# Patient Record
Sex: MALE | Race: WHITE
[De-identification: names, ages, dates, MRNs, and addresses within clinical notes are randomized per-mention and may not be internally consistent; named-entity substitution may affect disease eponyms.]

---

## 2017-11-23 ENCOUNTER — HOSPITAL ENCOUNTER (EMERGENCY)
Dept: HOSPITAL 17 - NEPK | Age: 27
Discharge: HOME | End: 2017-11-23
Payer: SELF-PAY

## 2017-11-23 VITALS
SYSTOLIC BLOOD PRESSURE: 148 MMHG | TEMPERATURE: 98.7 F | OXYGEN SATURATION: 97 % | DIASTOLIC BLOOD PRESSURE: 82 MMHG | HEART RATE: 87 BPM | RESPIRATION RATE: 20 BRPM

## 2017-11-23 VITALS — WEIGHT: 176.37 LBS | HEIGHT: 69 IN | BODY MASS INDEX: 26.12 KG/M2

## 2017-11-23 DIAGNOSIS — F41.9: ICD-10-CM

## 2017-11-23 DIAGNOSIS — I80.8: Primary | ICD-10-CM

## 2017-11-23 DIAGNOSIS — I10: ICD-10-CM

## 2017-11-23 PROCEDURE — 99283 EMERGENCY DEPT VISIT LOW MDM: CPT

## 2017-11-23 NOTE — PD
HPI


Chief Complaint:  Skin Problem


Time Seen by Provider:  15:36


Travel History


International Travel<30 days:  No


Contact w/Intl Traveler<30days:  No


Traveled to known affect area:  No





History of Present Illness


HPI


28 yo M c/o pain and swelling along R elbow for about 1 week. pt reports fever 

yesterday. he believes a box may have slid down his arm causing some injury 

while he was working. no open skin or bleed reproted. pain is gradually 

decreasing in severity. no swelling about the arm. no distal arm pain/swelling/

sensory change.





PFSH


Past Medical History


Anxiety:  Yes


Cardiovascular Problems:  No


Endocrine:  No


Genitourinary:  No


Hypertension:  Yes


Immune Disorder:  No


Musculoskeletal:  No


Neurologic:  No


Reproductive:  No


Respiratory:  No





Past Surgical History


Abdominal Surgery:  No


Cardiac Surgery:  No


Ear Surgery:  No


Endocrine Surgery:  No


Eye Surgery:  No


Genitourinary Surgery:  No


Gynecologic Surgery:  No


Oral Surgery:  No


Thoracic Surgery:  No


Other Surgery:  Yes





Social History


Alcohol Use:  Yes (WEEKENDS)


Tobacco Use:  No


Substance Use:  No





Allergies-Medications


(Allergen,Severity, Reaction):  


Coded Allergies:  


     No Known Allergies (Verified  Adverse Reaction, Unknown, 11/23/17)


Reported Meds & Prescriptions





Reported Meds & Active Scripts


Active


Keflex (Cephalexin) 500 Mg Cap 500 Mg PO Q8H 7 Days


Ibuprofen 600 Mg Tab 600 Mg PO Q8H 7 Days








Review of Systems


Except as stated in HPI:  all other systems reviewed are Neg


General / Constitutional:  No: Fever


Skin:  Positive Lesions, No Rash





Physical Exam


Narrative


GENERAL: 28 yo M, WNWD, NAD, pleasant


SKIN: Warm and dry. Along the Radial aspect of the antecubital fossa there is 

about 5 cm x 1 cm nodule minimal tender somewhat firm swelling. no fluctuance. 

no erythema or warmth. 


HEAD: Atraumatic. Normocephalic. 


EYES: Pupils equal and round. No scleral icterus. No injection or drainage. 


ENT: No nasal bleeding or discharge.  Mucous membranes pink and moist.


NECK: Trachea midline. No JVD. 


CARDIOVASCULAR: Regular rate and rhythm.  2+ radial artery pulse bilaterally. 


RESPIRATORY: No accessory muscle use. Clear to auscultation. Breath sounds 

equal bilaterally. 


GASTROINTESTINAL: Abdomen soft, non-tender, nondistended. Hepatic and splenic 

margins not palpable. 


MUSCULOSKELETAL: Extremities without clubbing, cyanosis, or edema. No obvious 

deformities. No sign upper extremity DVT.


NEUROLOGICAL: Awake and alert. No obvious cranial nerve deficits.  Motor 

grossly within normal limits. Five out of 5 muscle strength in the arms and 

legs.  Normal speech.


PSYCHIATRIC: Appropriate mood and affect; insight and judgment normal.





Data


Data


Last Documented VS





Vital Signs








  Date Time  Temp Pulse Resp B/P (MAP) Pulse Ox O2 Delivery O2 Flow Rate FiO2


 


11/23/17 15:30 98.7 87 20 148/82 (104) 97 Room Air  





vs reviewed


Orders





 Orders


Ed Discharge Order (11/23/17 15:59)


Ibuprofen (Motrin) (11/23/17 16:00)


Cephalexin (Keflex) (11/23/17 16:00)








MDM


Medical Decision Making


Medical Screen Exam Complete:  Yes


Emergency Medical Condition:  Yes


Medical Record Reviewed:  Yes


Differential Diagnosis


abscess, cellulitis, supervficial thrombophlebitis


Narrative Course


trace cellulitis is a possibility


superficial thrombophlebitis is a possible consideration


no sign DVT and Doppler carries very low pretest probability





Diagnosis





 Primary Impression:  


 Superficial thrombophlebitis


 Qualified Codes:  I80.8 - Phlebitis and thrombophlebitis of other sites


***Med/Other Pt SpecificInfo:  Prescription(s) given


Scripts


Cephalexin (Keflex) 500 Mg Cap


500 MG PO Q8H for Infection for 7 Days, #21 CAP 0 Refills


   Prov: Yaw Lancaster MD         11/23/17 


Ibuprofen (Ibuprofen) 600 Mg Tab


600 MG PO Q8H for 7 Days, #21 TAB 0 Refills


   Prov: Yaw Lancaster MD         11/23/17


Disposition:  01 DISCHARGE HOME


Condition:  Stable











Yaw Lancaster MD Nov 23, 2017 16:01

## 2018-03-09 ENCOUNTER — HOSPITAL ENCOUNTER (EMERGENCY)
Dept: HOSPITAL 17 - NEPD | Age: 28
Discharge: HOME | End: 2018-03-09
Payer: SELF-PAY

## 2018-03-09 VITALS
HEART RATE: 99 BPM | OXYGEN SATURATION: 96 % | SYSTOLIC BLOOD PRESSURE: 142 MMHG | DIASTOLIC BLOOD PRESSURE: 86 MMHG | TEMPERATURE: 98 F | RESPIRATION RATE: 18 BRPM

## 2018-03-09 VITALS — BODY MASS INDEX: 25.14 KG/M2 | WEIGHT: 169.76 LBS | HEIGHT: 69 IN

## 2018-03-09 DIAGNOSIS — D17.22: Primary | ICD-10-CM

## 2018-03-09 PROCEDURE — 99283 EMERGENCY DEPT VISIT LOW MDM: CPT

## 2018-03-09 NOTE — PD
HPI


Chief Complaint:  Skin Problem


Time Seen by Provider:  11:03


Travel History


International Travel<30 days:  No


Contact w/Intl Traveler<30days:  No


Traveled to known affect area:  No





History of Present Illness


HPI


27-year-old  male with recurrent tender firm lump in the right 

antecubital space of the right elbow on the lateral aspect.  Patient states he 

was seen for this several months ago and treated with antibiotics, and it 

seemed to improve.  He states in the last week the area has become more tender 

and indurated, patient denies IV drug use.  He denies injury to the area.  He 

denies fever, chills, or drainage.  Pain is currently about a 5 out of 10.  He 

has no known drug allergies.





PFSH


Past Medical History


Anxiety:  Yes


Cardiovascular Problems:  No


Diminished Hearing:  No


Endocrine:  No


Genitourinary:  No


Hypertension:  Yes


Immune Disorder:  No


Musculoskeletal:  No


Neurologic:  No


Reproductive:  No


Respiratory:  No


Tetanus Vaccination:  Unknown


Pregnant?:  Not Pregnant





Past Surgical History


Abdominal Surgery:  No


Cardiac Surgery:  No


Ear Surgery:  No


Endocrine Surgery:  No


Eye Surgery:  No


Genitourinary Surgery:  No


Gynecologic Surgery:  No


Oral Surgery:  No


Thoracic Surgery:  No


Other Surgery:  Yes





Social History


Alcohol Use:  Yes (WEEKENDS)


Tobacco Use:  No


Substance Use:  No





Allergies-Medications


(Allergen,Severity, Reaction):  


Coded Allergies:  


     No Known Allergies (Verified  Adverse Reaction, Unknown, 3/9/18)


Reported Meds & Prescriptions





Reported Meds & Active Scripts


Active


Keflex (Cephalexin) 500 Mg Cap 500 Mg PO Q8H 7 Days


Ibuprofen 600 Mg Tab 600 Mg PO Q8H 7 Days








Review of Systems


General / Constitutional:  No: Fever


Eyes:  No: Visual changes


HENT:  No: Headaches


Cardiovascular:  No: Chest Pain or Discomfort


Respiratory:  No: Shortness of Breath


Gastrointestinal:  No: Abdominal Pain


Genitourinary:  No: Dysuria


Musculoskeletal:  No: Pain


Skin:  Positive Lumps (See history of present), Positive Lesions (See history 

of present illness), No Rash


Neurologic:  No: Weakness


Psychiatric:  No: Depression


Endocrine:  No: Polydipsia


Hematologic/Lymphatic:  No: Easy Bruising





Physical Exam


Narrative


GENERAL: Patient is in no acute distress


SKIN: Warm and dry.  Normal color.  Normal turgor.  Patient has a firm rubbery 

feeling lump to the right lateral antecubital space of the right elbow 

insistent with either lipoma, scar tissue, or possible early abscess.  There is 

no significant erythema.  There is no streaking or lymphangitis there is no 

pointing.


HEAD: Atraumatic. Normocephalic.  


EYES: Pupils equal and round. No scleral icterus. No injection or drainage. 


ENT: No nasal bleeding or discharge.  Mucous membranes pink and moist.  Pharynx 

is clear.  Airways patent.


NECK: Trachea midline.  Supple and nontender.


CARDIOVASCULAR: Regular rate and rhythm.  No murmurs gallops or rubs


RESPIRATORY: No accessory muscle use. Clear to auscultation. Breath sounds 

equal bilaterally. 


GASTROINTESTINAL: Abdomen soft, non-tender, nondistended. Hepatic and splenic 

margins not palpable. 


MUSCULOSKELETAL: Extremities without clubbing, cyanosis, or edema. No obvious 

deformities. 


NEUROLOGICAL: Awake and alert. No obvious cranial nerve deficits.  Motor 

grossly within normal limits. Five out of 5 muscle strength in the arms and 

legs.  Normal speech.


PSYCHIATRIC: Appropriate mood and affect; insight and judgment normal.





Data


Data


Last Documented VS





Vital Signs








  Date Time  Temp Pulse Resp B/P (MAP) Pulse Ox O2 Delivery O2 Flow Rate FiO2


 


3/9/18 09:58 98.0 99 18 142/86 (104) 96   











MDM


Medical Decision Making


Medical Screen Exam Complete:  Yes


Emergency Medical Condition:  Yes


Differential Diagnosis


Lipoma.  Scar tissue.  Keloid.  Cellulitis.  Early abscess.


Narrative Course


Patient is given Bactrim DS twice daily 7 days per


Patient is given ibuprofen 800 mg 3 times daily as needed pain #30.


Patient to follow-up with [facility] as needed.





Diagnosis





 Primary Impression:  


 Lipoma of left upper extremity


Referrals:  


[facility]


Patient Instructions:  General Instructions





***Additional Instructions:  


Patient is given Bactrim DS twice daily 7 days per


Patient is given ibuprofen 800 mg 3 times daily as needed pain #30.


Patient to follow-up with [facility] as needed.


***Med/Other Pt SpecificInfo:  Prescription(s) given


Disposition:  01 DISCHARGE HOME


Condition:  Stable











Shimon Michel Mar 9, 2018 11:15

## 2018-03-31 ENCOUNTER — HOSPITAL ENCOUNTER (EMERGENCY)
Dept: HOSPITAL 17 - NEPD | Age: 28
Discharge: HOME | End: 2018-03-31
Payer: SELF-PAY

## 2018-03-31 VITALS
RESPIRATION RATE: 18 BRPM | TEMPERATURE: 98 F | OXYGEN SATURATION: 98 % | SYSTOLIC BLOOD PRESSURE: 127 MMHG | HEART RATE: 84 BPM | DIASTOLIC BLOOD PRESSURE: 73 MMHG

## 2018-03-31 VITALS — HEIGHT: 69 IN | BODY MASS INDEX: 25.96 KG/M2 | WEIGHT: 175.27 LBS

## 2018-03-31 VITALS — SYSTOLIC BLOOD PRESSURE: 125 MMHG | DIASTOLIC BLOOD PRESSURE: 74 MMHG

## 2018-03-31 DIAGNOSIS — F41.9: ICD-10-CM

## 2018-03-31 DIAGNOSIS — L08.9: Primary | ICD-10-CM

## 2018-03-31 DIAGNOSIS — F19.11: ICD-10-CM

## 2018-03-31 DIAGNOSIS — I10: ICD-10-CM

## 2018-03-31 PROCEDURE — 99283 EMERGENCY DEPT VISIT LOW MDM: CPT

## 2018-03-31 NOTE — PD
HPI


Chief Complaint:  Lump, Cyst, Hernia


Time Seen by Provider:  11:26


Travel History


International Travel<30 days:  No


Contact w/Intl Traveler<30days:  No


Traveled to known affect area:  No





History of Present Illness


HPI


This patient complains of an infected lesion on his right arm.  This is at the 

site where he used to inject drugs.  He says his last injection was 2 months 

ago.  He does have history of IV drug abuse.  He's had no fever.  He took a 

week of Bactrim but it didn't completely clear this up.  No drainage.  Symptoms 

severity is mild





PFSH


Past Medical History


Anxiety:  Yes


Cardiovascular Problems:  No


Diminished Hearing:  No


Endocrine:  No


Genitourinary:  No


Hypertension:  Yes


Immune Disorder:  No


Musculoskeletal:  No


Neurologic:  No


Reproductive:  No


Respiratory:  No





Past Surgical History


Abdominal Surgery:  No


Cardiac Surgery:  No


Ear Surgery:  No


Endocrine Surgery:  No


Eye Surgery:  No


Genitourinary Surgery:  No


Gynecologic Surgery:  No


Oral Surgery:  No


Thoracic Surgery:  No


Other Surgery:  Yes





Social History


Alcohol Use:  Yes (WEEKENDS)


Tobacco Use:  No


Substance Use:  No





Allergies-Medications


(Allergen,Severity, Reaction):  


Coded Allergies:  


     No Known Allergies (Verified  Adverse Reaction, Unknown, 3/9/18)


Reported Meds & Prescriptions





Reported Meds & Active Scripts


Active


Clindamycin (Clindamycin HCl) 150 Mg Cap 300 Mg PO Q8HR 10 Days


Doxycycline Hyclate 100 Mg Cap 100 Mg PO BID


Ibuprofen 800 Mg Tab 800 Mg PO Q8H PRN


Bactrim DS (Sulfamethoxazole-Trimethoprim) 800-160 Mg Tab 1 Tab PO BID


Keflex (Cephalexin) 500 Mg Cap 500 Mg PO Q8H 7 Days


Ibuprofen 600 Mg Tab 600 Mg PO Q8H 7 Days








Review of Systems


General / Constitutional:  No: Fever


Eyes:  No: Visual changes


HENT:  No: Headaches


Cardiovascular:  No: Chest Pain or Discomfort


Respiratory:  No: Shortness of Breath


Gastrointestinal:  No: Abdominal Pain


Genitourinary:  No: Dysuria


Musculoskeletal:  No: Pain


Skin:  Positive Lesions, No Rash


Neurologic:  No: Weakness


Psychiatric:  No: Depression


Endocrine:  No: Polydipsia


Hematologic/Lymphatic:  No: Easy Bruising





Physical Exam


Narrative


GENERAL: Well-nourished, well-developed patient in no apparent distress.


SKIN: Focused skin assessment reveals no rash and nodules. Skin is Warm and dry.


HEAD: Atraumatic. Normocephalic. 


EYES: Pupils equal and round. No scleral icterus. No injection or drainage. 


ENT: No nasal bleeding or discharge.  Mucous membranes pink and moist.


NECK: Trachea midline. No JVD. 


CARDIOVASCULAR: Regular rate and rhythm.  No murmur appreciated.


RESPIRATORY: No accessory muscle use. Clear to auscultation. Breath sounds 

equal bilaterally. 


GASTROINTESTINAL: Abdomen soft, non-tender, nondistended. Hepatic and splenic 

margins not palpable. 


MUSCULOSKELETAL: No obvious deformities. No clubbing.  No cyanosis.  No edema.  

Patient has a small erythematous indurated area on the right forearm near the 

antecubital fossa.  There is no fluctuance or drainage.  No adenopathy


NEUROLOGICAL: Awake and alert. No obvious cranial nerve deficits.  Motor 

grossly within normal limits. Normal speech.


PSYCHIATRIC: Appropriate mood and affect; insight and judgment normal.





Data


Data


Last Documented VS





Vital Signs








  Date Time  Temp Pulse Resp B/P (MAP) Pulse Ox O2 Delivery O2 Flow Rate FiO2


 


3/31/18 11:03 98.0 84 18 127/73 (91) 98   











MDM


Medical Decision Making


Medical Screen Exam Complete:  Yes


Emergency Medical Condition:  Yes


Medical Record Reviewed:  Yes


Differential Diagnosis


Infected lesion, cellulitis, abscess


Narrative Course


I have reviewed the patient's electronic medical record.  I reviewed his visit 

from 3 weeks ago.  Was diagnosed as a lipoma which it is clearly not a lipoma.


Does have characteristics of infection





It is not fluctuant and is nowhere near appropriate for incision and drainage





Have no clinical suspicion of endocarditis.  He has no fever or tachycardia or 

chest symptoms or murmur


He does not require blood cultures or inpatient care or echocardiogram





He is currently in a drug rehabilitation place and has not used for 2 months





I gave him 10 days of a combination of doxycycline and clindamycin


Recommend warm compresses


Return for any worsening





Diagnosis





 Primary Impression:  


 Infected skin lesion


 Additional Impression:  


 History of intravenous drug abuse





***Additional Instructions:  


The patient was advised to follow up with their physician and return if they 

worsen.


Take antibiotics


Use warm compresses


***Med/Other Pt SpecificInfo:  Prescription(s) given


Scripts


Clindamycin (Clindamycin) 150 Mg Cap


300 MG PO Q8HR for Infection for 10 Days, CAP 0 Refills


   Prov: Dallas Villegas MD         3/31/18 


Doxycycline Hyclate (Doxycycline Hyclate) 100 Mg Cap


100 MG PO BID for Infection, #20 CAP 0 Refills


   Prov: Dallas Villegas MD         3/31/18


Disposition:  01 DISCHARGE HOME


Condition:  Stable











Dallas Villegas MD Mar 31, 2018 12:55

## 2018-05-29 ENCOUNTER — HOSPITAL ENCOUNTER (OUTPATIENT)
Dept: HOSPITAL 17 - NEPC | Age: 28
Setting detail: OBSERVATION
LOS: 1 days | Discharge: LEFT BEFORE DISCHARGE | End: 2018-05-30
Attending: FAMILY MEDICINE | Admitting: FAMILY MEDICINE
Payer: SELF-PAY

## 2018-05-29 VITALS
OXYGEN SATURATION: 98 % | RESPIRATION RATE: 16 BRPM | DIASTOLIC BLOOD PRESSURE: 75 MMHG | SYSTOLIC BLOOD PRESSURE: 141 MMHG | TEMPERATURE: 102.1 F | HEART RATE: 102 BPM

## 2018-05-29 VITALS
RESPIRATION RATE: 16 BRPM | DIASTOLIC BLOOD PRESSURE: 85 MMHG | TEMPERATURE: 99 F | HEART RATE: 84 BPM | OXYGEN SATURATION: 100 % | SYSTOLIC BLOOD PRESSURE: 142 MMHG

## 2018-05-29 VITALS — BODY MASS INDEX: 27.76 KG/M2 | WEIGHT: 187.39 LBS | HEIGHT: 69 IN

## 2018-05-29 DIAGNOSIS — F41.9: ICD-10-CM

## 2018-05-29 DIAGNOSIS — F19.10: ICD-10-CM

## 2018-05-29 DIAGNOSIS — R50.9: ICD-10-CM

## 2018-05-29 DIAGNOSIS — I10: ICD-10-CM

## 2018-05-29 DIAGNOSIS — T40.1X1A: Primary | ICD-10-CM

## 2018-05-29 DIAGNOSIS — F17.210: ICD-10-CM

## 2018-05-29 DIAGNOSIS — L08.9: ICD-10-CM

## 2018-05-29 DIAGNOSIS — I45.10: ICD-10-CM

## 2018-05-29 LAB
ALBUMIN SERPL-MCNC: 3.9 GM/DL (ref 3.4–5)
ALP SERPL-CCNC: 69 U/L (ref 45–117)
ALT SERPL-CCNC: 28 U/L (ref 12–78)
AST SERPL-CCNC: 25 U/L (ref 15–37)
BASOPHILS # BLD AUTO: 0.1 TH/MM3 (ref 0–0.2)
BASOPHILS NFR BLD: 0.6 % (ref 0–2)
BILIRUB SERPL-MCNC: 0.5 MG/DL (ref 0.2–1)
BUN SERPL-MCNC: 9 MG/DL (ref 7–18)
CALCIUM SERPL-MCNC: 8.7 MG/DL (ref 8.5–10.1)
CHLORIDE SERPL-SCNC: 103 MEQ/L (ref 98–107)
COLOR UR: YELLOW
CREAT SERPL-MCNC: 1.13 MG/DL (ref 0.6–1.3)
EOSINOPHIL # BLD: 0 TH/MM3 (ref 0–0.4)
EOSINOPHIL NFR BLD: 0.2 % (ref 0–4)
ERYTHROCYTE [DISTWIDTH] IN BLOOD BY AUTOMATED COUNT: 14.4 % (ref 11.6–17.2)
GFR SERPLBLD BASED ON 1.73 SQ M-ARVRAT: 77 ML/MIN (ref 89–?)
GLUCOSE SERPL-MCNC: 119 MG/DL (ref 74–106)
GLUCOSE UR STRIP-MCNC: (no result) MG/DL
HCO3 BLD-SCNC: 27.7 MEQ/L (ref 21–32)
HCT VFR BLD CALC: 39.4 % (ref 39–51)
HGB BLD-MCNC: 13.5 GM/DL (ref 13–17)
HGB UR QL STRIP: (no result)
HYALINE CASTS #/AREA URNS LPF: 2 /LPF
KETONES UR STRIP-MCNC: (no result) MG/DL
LYMPHOCYTES # BLD AUTO: 1 TH/MM3 (ref 1–4.8)
LYMPHOCYTES NFR BLD AUTO: 8.2 % (ref 9–44)
MCH RBC QN AUTO: 29 PG (ref 27–34)
MCHC RBC AUTO-ENTMCNC: 34.4 % (ref 32–36)
MCV RBC AUTO: 84.3 FL (ref 80–100)
MONOCYTE #: 0.9 TH/MM3 (ref 0–0.9)
MONOCYTES NFR BLD: 8.1 % (ref 0–8)
MUCOUS THREADS #/AREA URNS LPF: (no result) /LPF
NEUTROPHILS # BLD AUTO: 9.6 TH/MM3 (ref 1.8–7.7)
NEUTROPHILS NFR BLD AUTO: 82.9 % (ref 16–70)
NITRITE UR QL STRIP: (no result)
PLATELET # BLD: 234 TH/MM3 (ref 150–450)
PMV BLD AUTO: 8.4 FL (ref 7–11)
PROT SERPL-MCNC: 7.2 GM/DL (ref 6.4–8.2)
RBC # BLD AUTO: 4.67 MIL/MM3 (ref 4.5–5.9)
SODIUM SERPL-SCNC: 137 MEQ/L (ref 136–145)
SP GR UR STRIP: 1.02 (ref 1–1.03)
URINE LEUKOCYTE ESTERASE: (no result)
WBC # BLD AUTO: 11.5 TH/MM3 (ref 4–11)

## 2018-05-29 PROCEDURE — 80048 BASIC METABOLIC PNL TOTAL CA: CPT

## 2018-05-29 PROCEDURE — 87389 HIV-1 AG W/HIV-1&-2 AB AG IA: CPT

## 2018-05-29 PROCEDURE — 96368 THER/DIAG CONCURRENT INF: CPT

## 2018-05-29 PROCEDURE — 85025 COMPLETE CBC W/AUTO DIFF WBC: CPT

## 2018-05-29 PROCEDURE — G0378 HOSPITAL OBSERVATION PER HR: HCPCS

## 2018-05-29 PROCEDURE — G0475 HIV COMBINATION ASSAY: HCPCS

## 2018-05-29 PROCEDURE — 71045 X-RAY EXAM CHEST 1 VIEW: CPT

## 2018-05-29 PROCEDURE — 80053 COMPREHEN METABOLIC PANEL: CPT

## 2018-05-29 PROCEDURE — 80074 ACUTE HEPATITIS PANEL: CPT

## 2018-05-29 PROCEDURE — 96365 THER/PROPH/DIAG IV INF INIT: CPT

## 2018-05-29 PROCEDURE — 96361 HYDRATE IV INFUSION ADD-ON: CPT

## 2018-05-29 PROCEDURE — 81001 URINALYSIS AUTO W/SCOPE: CPT

## 2018-05-29 PROCEDURE — 80307 DRUG TEST PRSMV CHEM ANLYZR: CPT

## 2018-05-29 PROCEDURE — 99285 EMERGENCY DEPT VISIT HI MDM: CPT

## 2018-05-29 PROCEDURE — 87040 BLOOD CULTURE FOR BACTERIA: CPT

## 2018-05-29 PROCEDURE — 93005 ELECTROCARDIOGRAM TRACING: CPT

## 2018-05-29 NOTE — HHI.HP
HPI


Service


Family Medicine


Primary Care Physician


Unknown


Admission Diagnosis





Diagnoses:  


International Travel<30 Days:  No


Contact w/Intl Traveler<30days:  No


Known Affected Area:  No


History of Present Illness


26 y/o M presents w/fever after heroin overdose.





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 

left arm (extensor surface) at work today. Took less than his usual amount but 

was told that after using, he started having agonal breathing and became 

cyanotic at his desk. Underwent CPR and resuscitation. A co-worker found 

narcane and gave it to him twice, which revived him. He says that he is now 

feeling fine. Similar episodes of overdose have happened 8 times, but he has 

not been hospitalized each time. Has had "sterile" abscesses in his arm, his 

last abscess appearing two months ago as a large nodule on the lateral edge of 

his right elbow.  He does not know what it is, describes it as appearing after 

"shooting up" and is tender to touch.  Has received antibiotics for it twice, 

but each time, it has worsened after some time. Last treated 3-4 months ago for 

infection at the site. Thinks it is currently a scar.





When patient woke up this morning, he also injected heroin, and afterwards felt 

hot and nauseous. No rhinorrhea. No chest pain, SOB, or diarrhea. No cough. No 

recent headaches or neck stiffness. No back pain. 


Does not have a PCP. 





Hx solely of heroin use, states he has only used clean needles. Before today, 

he had been clean for 45 days after attending a detox in [place], then 

staying at a half-way house. Goes to  and has a sponsor.





Review of Systems


Constitutional:  DENIES: Weight loss, Change in appetite, Night Sweats


Endocrine:  DENIES: Polyuria


Eyes:  DENIES: Blurred vision, Vision loss


Ears, nose, mouth, throat:  DENIES: Hearing loss, Throat pain, Running Nose


Respiratory:  DENIES: Cough, Shortness of breath


Cardiovascular:  DENIES: Chest pain, Palpitations


Gastrointestinal:  DENIES: Constipation, Diarrhea, Vomiting


Genitourinary:  DENIES: Urinary frequency, Urinary incontinence


Musculoskeletal:  DENIES: Joint pain


Hematologic/lymphatic:  DENIES: Bruising


Immunologic/allergic:  DENIES: Eczema


Neurologic:  DENIES: Headache, Poor Balance





Past Family Social History


Past Medical History


Anxiety


Past Surgical History


Titanium machelle in femur for fracture - 4 years ago


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


Allergies:  


Coded Allergies:  


     No Known Allergies (Verified  Adverse Reaction, Unknown, 5/29/18)


Family History


Mom: not known


Dad: not known


Social History


Lives at a sober house in [place]


Smokes 1 pack/day


No other recreational/illicit drug use


3 beers x1 on the weekend





Physical Exam


Vital Signs





Vital Signs








  Date Time  Temp Pulse Resp B/P (MAP) Pulse Ox O2 Delivery O2 Flow Rate FiO2


 


5/29/18 22:42 99.0 84 16 142/85 (104) 100 Room Air  


 


5/29/18 20:05 102.1 102 16 141/75 (97) 98   


 


5/29/18 20:03  112    Room Air  








Physical Exam


GENERAL: This is a well-nourished, heavily tatooed M resting comfortably in 

bed. In no acute distress. 


SKIN: Cool and dry. 1 inch area of elevated skin that is dark purple w/ faint 

surrounding erythema less than a mm from edge on the lateral extensor surface 

of the right elbow. Some tenderness to palpation. Area of induration v skin 

thickening (track natalee) felt at site. No lesions observed on hands, legs, nick, 

between toes or fingers. No splinter hemorrhages or janeway lesions noted.


HEAD: Atraumatic. Normocephalic. No temporal or scalp tenderness.


EYES: Pupils equal round and reactive. Extraocular motions intact. No scleral 

icterus. No injection or drainage. 


ENT: Nose without bleeding, purulent drainage or septal hematoma. Throat 

without erythema, tonsillar hypertrophy or exudate. Uvula midline. Airway 

patent.


NECK: Trachea midline. No lymphadenopathy. Supple, nontender.


CARDIOVASCULAR: Regular rate and rhythm without murmurs, gallops, or rubs 

auscultated. 


RESPIRATORY: Clear to auscultation. Breath sounds equal bilaterally. No wheezes

, rales, or rhonchi.  


GASTROINTESTINAL: Abdomen soft, non-tender, nondistended. No hepato-splenomegaly

, or palpable masses. No guarding.


MUSCULOSKELETAL: Extremities without clubbing, cyanosis, or edema. No joint 

tenderness, effusion, or edema noted. 


NEUROLOGICAL: Awake and alert. No focal deficits.  Motor and sensory grossly 

within normal limits.  Normal speech.


Laboratory





Laboratory Tests








Test


  5/29/18


20:25 5/29/18


22:10


 


White Blood Count 11.5  


 


Red Blood Count 4.67  


 


Hemoglobin 13.5  


 


Hematocrit 39.4  


 


Mean Corpuscular Volume 84.3  


 


Mean Corpuscular Hemoglobin 29.0  


 


Mean Corpuscular Hemoglobin


Concent 34.4 


  


 


 


Red Cell Distribution Width 14.4  


 


Platelet Count 234  


 


Mean Platelet Volume 8.4  


 


Neutrophils (%) (Auto) 82.9  


 


Lymphocytes (%) (Auto) 8.2  


 


Monocytes (%) (Auto) 8.1  


 


Eosinophils (%) (Auto) 0.2  


 


Basophils (%) (Auto) 0.6  


 


Neutrophils # (Auto) 9.6  


 


Lymphocytes # (Auto) 1.0  


 


Monocytes # (Auto) 0.9  


 


Eosinophils # (Auto) 0.0  


 


Basophils # (Auto) 0.1  


 


CBC Comment DIFF FINAL  


 


Differential Comment   


 


Blood Urea Nitrogen 9  


 


Creatinine 1.13  


 


Random Glucose 119  


 


Total Protein 7.2  


 


Albumin 3.9  


 


Calcium Level 8.7  


 


Alkaline Phosphatase 69  


 


Aspartate Amino Transf


(AST/SGOT) 25 


  


 


 


Alanine Aminotransferase


(ALT/SGPT) 28 


  


 


 


Total Bilirubin 0.5  


 


Sodium Level 137  


 


Potassium Level 3.6  


 


Chloride Level 103  


 


Carbon Dioxide Level 27.7  


 


Anion Gap 6  


 


Estimat Glomerular Filtration


Rate 77 


  


 


 


Urine Color  YELLOW 


 


Urine Turbidity  CLEAR 


 


Urine pH  6.5 


 


Urine Specific Gravity  1.018 


 


Urine Protein  NEG 


 


Urine Glucose (UA)  NEG 


 


Urine Ketones  NEG 


 


Urine Occult Blood  NEG 


 


Urine Nitrite  NEG 


 


Urine Bilirubin  NEG 


 


Urine Urobilinogen  LESS THAN 2.0 


 


Urine Leukocyte Esterase  NEG 


 


Urine RBC  1 


 


Urine WBC  1 


 


Urine Hyaline Casts  2 


 


Urine Mucus  FEW 


 


Microscopic Urinalysis Comment


  


  CULT NOT


INDICATED


 


Urine Opiates Screen  POS 


 


Urine Barbiturates Screen  NEG 


 


Urine Amphetamines Screen  NEG 


 


Urine Benzodiazepines Screen  NEG 


 


Urine Cocaine Screen  POS 


 


Urine Cannabinoids Screen  NEG 














 Date/Time


Source Procedure


Growth Status


 


 


 5/29/18 21:50


Blood Peripheral Aerobic Blood Culture


Pending Received


 


 5/29/18 21:50


Blood Peripheral Anaerobic Blood Culture


Pending Received








Result Diagram:  


5/29/18 2025 5/29/18 2025





Imaging





Last Impressions








Chest X-Ray 5/29/18 0000 Signed





Impressions: 





 CONCLUSION: 





 No acute intrathoracic disease.





  





 








Course


Per ED physician note:


"Initial vital signs show heart rate 102, blood pressure 141/75, pulse ox 90% 

on room air, oral temp of 102.1F.





CBC: WBC 11.5, hemoglobin 13.5, hematocrit 39.4, platelets 234, neutrophils 83%.


CMP is unremarkable.


UA is not suggestive of UTI.


Urine drug screen is positive for opiates and cocaine.





Chest x-ray:


No acute intrathoracic disease.





Given fever with this history of IV drug use, blood cultures were obtained.  

The patient was started empirically on antibiotics to cover for bacteremia/

endocarditis."





Caprini VTE Risk Assessment


Caprini VTE Risk Assessment:  No/Low Risk (score <= 1)





Assessment and Plan


Assessment and Plan


26 y/o M w/hx of IVDU presenting w/fever (102.1). Admitted for observation to 

further assess for infection and r/o endocarditis.


Code Status


FULL


Problem List:  


(1) Fever


ICD Codes:  R50.9 - Fever, unspecified


Status:  Acute


Plan:  WBC count slightly elevated


IVDU hx w/clean needles


CXR clear, physical exam benign except for chronic track natalee +/- developing 

skin infection


Vanc and Zosyn x1, Tyenol, NS bolus x1 in the ED


Meets two of [facility] minor criteria, no indication to treat w/antibiotics at 

this time





2D echo


Tylenol PRN for fever >100.4


HIV screen


Hep profile


blood cultures


cardiac tele





(2) IV drug abuse


ICD Codes:  F19.10 - Other psychoactive substance abuse, uncomplicated


Status:  Chronic


Plan:  Has had several relapses





CM consulted





(3) FEN


Plan:  Fluids: not indicated


Electrolytes: none


Nutrition: regular


DVT prophy: not indicated








Problem Qualifiers





(1) Fever:  


Qualified Codes:  R50.9 - Fever, unspecified








Kiara Garcia MD R1 May 29, 2018 23:29

## 2018-05-29 NOTE — PD
HPI


Chief Complaint:  OD/ Ingestion


Time Seen by Provider:  20:03


Travel History


International Travel<30 days:  No


Contact w/Intl Traveler<30days:  No


Traveled to known affect area:  No





History of Present Illness


HPI


27-year-old male brought in by [facility] after an unintentional opiate overdose.  

The patient injected heroin while at work.  His coworkers found him 

unresponsive.  They began doing chest compressions and administered 8 mg of 

nasal Narcan with significant improvement in mental status.  Patient arrives 

stating that he feels dope sick.  He denies using any other drugs.  He reports 

that this was an unintentional overdose.  He is not suicidal.





[facility]


Past Medical History


Anxiety:  Yes


Cardiovascular Problems:  No


Diminished Hearing:  No


Endocrine:  No


Genitourinary:  No


Hypertension:  Yes


Immune Disorder:  No


Musculoskeletal:  No


Neurologic:  No


Reproductive:  No


Respiratory:  No





Past Surgical History


Abdominal Surgery:  No


Cardiac Surgery:  No


Ear Surgery:  No


Endocrine Surgery:  No


Eye Surgery:  No


Genitourinary Surgery:  No


Gynecologic Surgery:  No


Oral Surgery:  No


Thoracic Surgery:  No


Other Surgery:  Yes





Social History


Alcohol Use:  Yes (WEEKENDS)


Tobacco Use:  No


Substance Use:  No





Allergies-Medications


(Allergen,Severity, Reaction):  


Coded Allergies:  


     No Known Allergies (Verified  Adverse Reaction, Unknown, 5/29/18)


Reported Meds & Prescriptions





Reported Meds & Active Scripts


Active


No Active Prescriptions or Reported Medications    








Review of Systems


Except as stated in HPI:  all other systems reviewed are Neg





Physical Exam


Narrative


GENERAL: Well-developed, well-nourished, awake, alert, no apparent distress.


SKIN: Focused skin assessment warm/dry.  No splinter hemorrhages, Janeway 

lesions, or Osler nodes.


HEAD: Atraumatic. Normocephalic. 


EYES: Pupils equal and round. No scleral icterus. No injection or drainage. 


ENT: No nasal bleeding or discharge.  Mucous membranes pink and dry.


NECK: Trachea midline. No JVD. 


CARDIOVASCULAR: Tachycardic, rate 110, regular.  No murmurs.


RESPIRATORY: No accessory muscle use. Clear to auscultation. Breath sounds 

equal bilaterally. 


GASTROINTESTINAL: Abdomen soft, non-tender, nondistended. 


MUSCULOSKELETAL: No obvious deformities. No clubbing.  No cyanosis.  No edema. 


NEUROLOGICAL: Awake and alert. No obvious cranial nerve deficits.  Motor 

grossly within normal limits. Normal speech.


PSYCHIATRIC: Appropriate mood and affect; insight and judgment normal.





Data


Data


Last Documented VS





Vital Signs








  Date Time  Temp Pulse Resp B/P (MAP) Pulse Ox O2 Delivery O2 Flow Rate FiO2


 


5/29/18 22:42 99.0 84 16 142/85 (104) 100 Room Air  








Orders





 Orders


Sodium Chlor 0.9% 1000 Ml Inj (Ns 1000 M (5/29/18 20:15)


Complete Blood Count With Diff (5/29/18 20:19)


Comprehensive Metabolic Panel (5/29/18 20:19)


Iv Access Insert/Monitor (5/29/18 20:19)


Ecg Monitoring (5/29/18 20:19)


Oximetry (5/29/18 20:19)


Sodium Chloride 0.9% Flush (Ns Flush) (5/29/18 20:30)


Acetaminophen (Tylenol) (5/29/18 20:30)


Urinalysis - C+S If Indicated (5/29/18 21:29)


Drug Screen, Random Urine (5/29/18 21:29)


Chest, Single Ap (5/29/18 )


Blood Culture (5/29/18 21:29)


Electrocardiogram (5/29/18 20:03)


Sodium Chlor 0.9% 1000 Ml Inj (Ns 1000 M (5/29/18 22:00)


Vancomycin Inj (Vancomycin Inj) (5/29/18 22:15)


Piperacil-Tazo 4.5 Gm Premix (Zosyn 4.5 (5/29/18 22:15)





Labs





Laboratory Tests








Test


  5/29/18


20:25 5/29/18


22:10


 


White Blood Count 11.5 TH/MM3  


 


Red Blood Count 4.67 MIL/MM3  


 


Hemoglobin 13.5 GM/DL  


 


Hematocrit 39.4 %  


 


Mean Corpuscular Volume 84.3 FL  


 


Mean Corpuscular Hemoglobin 29.0 PG  


 


Mean Corpuscular Hemoglobin


Concent 34.4 % 


  


 


 


Red Cell Distribution Width 14.4 %  


 


Platelet Count 234 TH/MM3  


 


Mean Platelet Volume 8.4 FL  


 


Neutrophils (%) (Auto) 82.9 %  


 


Lymphocytes (%) (Auto) 8.2 %  


 


Monocytes (%) (Auto) 8.1 %  


 


Eosinophils (%) (Auto) 0.2 %  


 


Basophils (%) (Auto) 0.6 %  


 


Neutrophils # (Auto) 9.6 TH/MM3  


 


Lymphocytes # (Auto) 1.0 TH/MM3  


 


Monocytes # (Auto) 0.9 TH/MM3  


 


Eosinophils # (Auto) 0.0 TH/MM3  


 


Basophils # (Auto) 0.1 TH/MM3  


 


CBC Comment DIFF FINAL  


 


Differential Comment   


 


Blood Urea Nitrogen 9 MG/DL  


 


Creatinine 1.13 MG/DL  


 


Random Glucose 119 MG/DL  


 


Total Protein 7.2 GM/DL  


 


Albumin 3.9 GM/DL  


 


Calcium Level 8.7 MG/DL  


 


Alkaline Phosphatase 69 U/L  


 


Aspartate Amino Transf


(AST/SGOT) 25 U/L 


  


 


 


Alanine Aminotransferase


(ALT/SGPT) 28 U/L 


  


 


 


Total Bilirubin 0.5 MG/DL  


 


Sodium Level 137 MEQ/L  


 


Potassium Level 3.6 MEQ/L  


 


Chloride Level 103 MEQ/L  


 


Carbon Dioxide Level 27.7 MEQ/L  


 


Anion Gap 6 MEQ/L  


 


Estimat Glomerular Filtration


Rate 77 ML/MIN 


  


 


 


Urine Color  YELLOW 


 


Urine Turbidity  CLEAR 


 


Urine pH  6.5 


 


Urine Specific Gravity  1.018 


 


Urine Protein  NEG mg/dL 


 


Urine Glucose (UA)  NEG mg/dL 


 


Urine Ketones  NEG mg/dL 


 


Urine Occult Blood  NEG 


 


Urine Nitrite  NEG 


 


Urine Bilirubin  NEG 


 


Urine Urobilinogen


  


  LESS THAN 2.0


MG/DL


 


Urine Leukocyte Esterase  NEG 


 


Urine RBC  1 /hpf 


 


Urine WBC  1 /hpf 


 


Urine Hyaline Casts  2 /lpf 


 


Urine Mucus  FEW /lpf 


 


Microscopic Urinalysis Comment


  


  CULT NOT


INDICATED


 


Urine Opiates Screen  POS 


 


Urine Barbiturates Screen  NEG 


 


Urine Amphetamines Screen  NEG 


 


Urine Benzodiazepines Screen  NEG 


 


Urine Cocaine Screen  POS 


 


Urine Cannabinoids Screen  NEG 











MDM


Medical Decision Making


Medical Screen Exam Complete:  Yes


Emergency Medical Condition:  Yes


Interpretation(s)


EKG: Sinus, rate 107, normal axis, normal intervals, no acute ischemic 

abnormality.


Differential Diagnosis


Opioid overdose, bacteremia, pneumonia, endocarditis, drug-induced hyperthermia


Narrative Course


Initial vital signs show heart rate 102, blood pressure 141/75, pulse ox 90% on 

room air, oral temp of 102.1F.





CBC: WBC 11.5, hemoglobin 13.5, hematocrit 39.4, platelets 234, neutrophils 83%.


CMP is unremarkable.


UA is not suggestive of UTI.


Urine drug screen is positive for opiates and cocaine.





Chest x-ray:


No acute intrathoracic disease.





Given fever with this history of IV drug use, blood cultures were obtained.  

The patient was started empirically on antibiotics to cover for bacteremia/

endocarditis.  He will be admitted for observation and follow-up blood 

cultures.  He is amenable to this plan.





Case discussed with the medical residents.  They will make the patient to their 

service under Dr. De Jesus





Diagnosis





 Primary Impression:  


 IV drug abuse


 Additional Impressions:  


 Fever


 Qualified Codes:  R50.9 - Fever, unspecified


 Opioid overdose


 Qualified Codes:  T40.2X1A - Poisoning by other opioids, accidental (

unintentional), initial encounter


 R/O Endocarditis





Admitting Information


Admitting Physician Requests:  Observation


Scripts


No Active Prescriptions or Reported Meds











Vishnu Unger MD May 29, 2018 20:05

## 2018-05-29 NOTE — RADRPT
EXAM DATE:  5/29/2018 9:44 PM EDT

AGE/SEX:        27 years / Male



INDICATIONS:  Fever.



CLINICAL DATA:  This is the patient's initial encounter. Patient reports that signs and symptoms have
 been present for 1 day and indicates a pain score of 0/10. 

                                                                          

MEDICAL/SURGICAL HISTORY:       None. None.



[place]      [facility] [place].  .



FINDINGS:  

A single AP view of the chest demonstrates the lungs to be symmetrically aerated without evidence of 
mass, infiltrate or effusion.  The cardiomediastinal contours are unremarkable.  Osseous structures a
re intact.  





CONCLUSION: 

No acute intrathoracic disease.



Electronically signed by: Carroll Fitzgerald MD  5/29/2018 9:49 PM EDT

## 2018-05-30 VITALS
OXYGEN SATURATION: 94 % | DIASTOLIC BLOOD PRESSURE: 76 MMHG | TEMPERATURE: 96.2 F | SYSTOLIC BLOOD PRESSURE: 124 MMHG | HEART RATE: 76 BPM | RESPIRATION RATE: 18 BRPM

## 2018-05-30 VITALS
TEMPERATURE: 98.3 F | RESPIRATION RATE: 16 BRPM | DIASTOLIC BLOOD PRESSURE: 69 MMHG | OXYGEN SATURATION: 99 % | SYSTOLIC BLOOD PRESSURE: 115 MMHG | HEART RATE: 63 BPM

## 2018-05-30 VITALS — HEART RATE: 81 BPM

## 2018-05-30 VITALS
TEMPERATURE: 97.2 F | DIASTOLIC BLOOD PRESSURE: 73 MMHG | SYSTOLIC BLOOD PRESSURE: 118 MMHG | RESPIRATION RATE: 18 BRPM | HEART RATE: 67 BPM | OXYGEN SATURATION: 97 %

## 2018-05-30 VITALS
SYSTOLIC BLOOD PRESSURE: 126 MMHG | HEART RATE: 75 BPM | DIASTOLIC BLOOD PRESSURE: 75 MMHG | RESPIRATION RATE: 16 BRPM | TEMPERATURE: 98.5 F | OXYGEN SATURATION: 91 %

## 2018-05-30 VITALS
RESPIRATION RATE: 17 BRPM | SYSTOLIC BLOOD PRESSURE: 123 MMHG | OXYGEN SATURATION: 94 % | DIASTOLIC BLOOD PRESSURE: 70 MMHG | TEMPERATURE: 97.3 F | HEART RATE: 76 BPM

## 2018-05-30 VITALS — HEART RATE: 95 BPM

## 2018-05-30 VITALS — HEART RATE: 68 BPM

## 2018-05-30 VITALS — HEART RATE: 85 BPM

## 2018-05-30 LAB
BASOPHILS # BLD AUTO: 0.1 TH/MM3 (ref 0–0.2)
BASOPHILS NFR BLD: 0.7 % (ref 0–2)
BUN SERPL-MCNC: 5 MG/DL (ref 7–18)
CALCIUM SERPL-MCNC: 8.7 MG/DL (ref 8.5–10.1)
CHLORIDE SERPL-SCNC: 105 MEQ/L (ref 98–107)
CREAT SERPL-MCNC: 0.72 MG/DL (ref 0.6–1.3)
EOSINOPHIL # BLD: 0.1 TH/MM3 (ref 0–0.4)
EOSINOPHIL NFR BLD: 0.6 % (ref 0–4)
ERYTHROCYTE [DISTWIDTH] IN BLOOD BY AUTOMATED COUNT: 15.1 % (ref 11.6–17.2)
GFR SERPLBLD BASED ON 1.73 SQ M-ARVRAT: 130 ML/MIN (ref 89–?)
GLUCOSE SERPL-MCNC: 94 MG/DL (ref 74–106)
HCO3 BLD-SCNC: 28.7 MEQ/L (ref 21–32)
HCT VFR BLD CALC: 38.3 % (ref 39–51)
HGB BLD-MCNC: 13.7 GM/DL (ref 13–17)
LYMPHOCYTES # BLD AUTO: 1.2 TH/MM3 (ref 1–4.8)
LYMPHOCYTES NFR BLD AUTO: 11.8 % (ref 9–44)
MCH RBC QN AUTO: 30.2 PG (ref 27–34)
MCHC RBC AUTO-ENTMCNC: 35.8 % (ref 32–36)
MCV RBC AUTO: 84.2 FL (ref 80–100)
MONOCYTE #: 1.1 TH/MM3 (ref 0–0.9)
MONOCYTES NFR BLD: 10 % (ref 0–8)
NEUTROPHILS # BLD AUTO: 8.1 TH/MM3 (ref 1.8–7.7)
NEUTROPHILS NFR BLD AUTO: 76.9 % (ref 16–70)
PLATELET # BLD: 200 TH/MM3 (ref 150–450)
PMV BLD AUTO: 8.5 FL (ref 7–11)
RBC # BLD AUTO: 4.55 MIL/MM3 (ref 4.5–5.9)
SODIUM SERPL-SCNC: 139 MEQ/L (ref 136–145)
WBC # BLD AUTO: 10.5 TH/MM3 (ref 4–11)

## 2018-05-30 NOTE — EKG
Date Performed: 05/29/2018       Time Performed: 20:03:39

 

PTAGE:      27 years

 

EKG:      SINUS TACHYCARDIA INCOMPLETE RIGHT BUNDLE BRANCH BLOCK ABNORMAL RHYTHM ECG

 

PREVIOUS TRACING       : 12/07/2013 11.37

 

DOCTOR:   Buddy Prado  Interpretating Date/Time  05/30/2018 14:05:41

## 2018-05-30 NOTE — HHI.FPPN
Subjective


Remarks


This progress note is written in conjunction with resident H&P dated 5/29/2018.





Stephanie Crowley is a 26yo gentleman with h/o IV heroin use admitted for evaluation 

of fever after injection IV heroin while he was at work. Narcan was 

administered in his work place, and resuscitation was provided for agonal 

breathing.





Overnight, he is without complaints. No further fevers. 





This morning, he feels ready for discharge and reports that he can return to 

his sober living house. He has multiple tattoos, which are all clean needle 

tattoos.





ROS: No chest pain, SOB, palpitations. No fever overnight. 





PMH/PSxH/SocHx/FamHx: Significant for: IV drug use. Anxiety. orthopedic 

surgeries. Tobacco abuse. Had been clean x 45 days, until relapse yesterday.





Objective


Vitals





Vital Signs








  Date Time  Temp Pulse Resp B/P (MAP) Pulse Ox O2 Delivery O2 Flow Rate FiO2


 


5/30/18 11:56 97.3 76 17 123/70 (87) 94   


 


5/30/18 08:11 97.2 67 18 118/73 (88) 97   


 


5/30/18 07:15  81      


 


5/30/18 06:24      Nasal Cannula 3.00 


 


5/30/18 04:15  68      


 


5/30/18 04:00 98.3 63 16 115/69 (84) 99   


 


5/30/18 02:45  85      


 


5/30/18 01:55 98.5 75 16 126/75 (92) 91   


 


5/29/18 22:42 99.0 84 16 142/85 (104) 100 Room Air  


 


5/29/18 20:05 102.1 102 16 141/75 (97) 98   


 


5/29/18 20:03  112    Room Air  














I/O      


 


 5/29/18 5/29/18 5/29/18 5/30/18 5/30/18 5/30/18





 07:00 15:00 23:00 07:00 15:00 23:00


 


Intake Total    500 ml  


 


Balance    500 ml  


 


      


 


Intake Oral    500 ml  


 


# Voids   1   








Result Diagram:  


5/30/18 1023                                                                   

             5/30/18 1023





Objective Remarks


Significant for:


In NAD, no resp distress. Nontoxic.


Right forearm with reddened area as described in resident H&P. Mildly tender, 

no increased calor.


No splinter  hemorrhages.


No murmur.





A/P


Assessment and Plan


26 y/o M w/hx of IVDU presenting w/fever (102.1). Admitted for observation to 

further assess for infection and r/o endocarditis.


Discharge Planning


Possible discharge today, pending echo results.


Attending Attestation


Patient seen, examined, and discussed with resident team.


Problem List:  


(1) Fever


ICD Codes:  R50.9 - Fever, unspecified


Status:  Acute


Plan:  WBC count slightly elevated


IVDU hx w/ clean needles


CXR clear, physical exam benign except for chronic track natalee +/- developing 

skin infection


Vanc and Zosyn x1, Tyenol, NS bolus x1 in the ED


Meets two of [facility] minor criteria, no indication to treat w/antibiotics at 

this time





2D echo


Tylenol PRN for fever >100.4


HIV screen: negative


Hep profile: negative


blood cultures: pending








(2) IV drug abuse


ICD Codes:  F19.10 - Other psychoactive substance abuse, uncomplicated


Status:  Chronic


Plan:  Has had several relapses.





CM consulted. Pt reports he can return to his sober house at discharge.








Problem Qualifiers





(1) Fever:  


Qualified Codes:  R50.9 - Fever, unspecified








Beulah De Jesus MD May 30, 2018 13:29

## 2018-05-30 NOTE — HHI.DCPOC
Discharge Care Plan


Diagnosis:  


(1) Opioid overdose


Goals to Promote Your Health


* To prevent worsening of your condition and complications


* To maintain your health at the optimal level


Directions to Meet Your Goals


*** Take your medications as prescribed


*** Follow your dietary instruction


*** Follow activity as directed








*** Keep your appointments as scheduled


*** Take your immunizations and boosters as scheduled


*** If your symptoms worsen call your PCP, if no PCP go to Urgent Care Center 

or Emergency Room***


*** Smoking is Dangerous to Your Health. Avoid second hand smoke***


***Call the 24-hour hour crisis hotline for domestic abuse at 1-592.807.2954***











Patrica Zamora MD R3 May 30, 2018 14:03